# Patient Record
Sex: FEMALE
[De-identification: names, ages, dates, MRNs, and addresses within clinical notes are randomized per-mention and may not be internally consistent; named-entity substitution may affect disease eponyms.]

---

## 2022-04-26 ENCOUNTER — RX ONLY (OUTPATIENT)
Age: 87
Setting detail: RX ONLY
End: 2022-04-26

## 2022-04-26 ENCOUNTER — APPOINTMENT (RX ONLY)
Dept: URBAN - METROPOLITAN AREA CLINIC 308 | Facility: CLINIC | Age: 87
Setting detail: DERMATOLOGY
End: 2022-04-26

## 2022-04-26 DIAGNOSIS — I87.2 VENOUS INSUFFICIENCY (CHRONIC) (PERIPHERAL): ICD-10-CM

## 2022-04-26 PROCEDURE — ? ADDITIONAL NOTES

## 2022-04-26 PROCEDURE — ? PRESCRIPTION

## 2022-04-26 PROCEDURE — ? COUNSELING

## 2022-04-26 PROCEDURE — 99214 OFFICE O/P EST MOD 30 MIN: CPT

## 2022-04-26 PROCEDURE — ? PRESCRIPTION MEDICATION MANAGEMENT

## 2022-04-26 RX ORDER — TRIAMCINOLONE ACETONIDE 1 MG/G
OINTMENT TOPICAL
Qty: 80 | Refills: 5 | Status: ERX | COMMUNITY
Start: 2022-04-26

## 2022-04-26 RX ADMIN — TRIAMCINOLONE ACETONIDE: 1 OINTMENT TOPICAL at 00:00

## 2022-04-26 ASSESSMENT — LOCATION DETAILED DESCRIPTION DERM
LOCATION DETAILED: LEFT DISTAL PRETIBIAL REGION
LOCATION DETAILED: RIGHT DORSAL FOOT
LOCATION DETAILED: RIGHT DISTAL PRETIBIAL REGION
LOCATION DETAILED: LEFT DORSAL FOOT

## 2022-04-26 ASSESSMENT — LOCATION SIMPLE DESCRIPTION DERM
LOCATION SIMPLE: RIGHT FOOT
LOCATION SIMPLE: LEFT PRETIBIAL REGION
LOCATION SIMPLE: LEFT FOOT
LOCATION SIMPLE: RIGHT PRETIBIAL REGION

## 2022-04-26 ASSESSMENT — LOCATION ZONE DERM
LOCATION ZONE: LEG
LOCATION ZONE: FEET

## 2022-04-26 NOTE — PROCEDURE: ADDITIONAL NOTES
Additional Notes: Pt complains of significant pain in her legs where swelling is present. \\nDiscussed concern for blood clots.\\nWill discuss care plan with nurse at [facility] who will be assisting the pt twice weekly. Additional Notes: Pt complains of significant pain in her legs where swelling is present. \\nDiscussed concern for blood clots.\\nWill discuss care plan with nurse at [facility] who will be assisting the pt twice weekly.

## 2022-04-26 NOTE — HPI: RASH
What Type Of Note Output Would You Prefer (Optional)?: Bullet Format
Is The Patient Presenting As Previously Scheduled?: Yes
Is This A New Presentation, Or A Follow-Up?: Rash
Additional History: Pt is unable to wear compression stockings as her socks rub on the sores on her feet and cause pain.\\nPt has a history of a blood clot in her left leg.\\nPt is taking an opiate for pain. \\nPt states a nurse going to be sent to her home to help wrap her legs.

## 2022-04-26 NOTE — PROCEDURE: PRESCRIPTION MEDICATION MANAGEMENT
Plan: Recommend spraying feet and lower legs with Levacyn spray twice every day. After Levacyn dries, cover affected areas with triamcinalone ointment twice daily. \\nDiscussed that infection can happen quickly with this condition and it is important to treat.
Render In Strict Bullet Format?: No
Detail Level: Zone

## 2022-06-01 ENCOUNTER — APPOINTMENT (RX ONLY)
Dept: URBAN - METROPOLITAN AREA CLINIC 308 | Facility: CLINIC | Age: 87
Setting detail: DERMATOLOGY
End: 2022-06-01

## 2022-06-01 DIAGNOSIS — L30.2 CUTANEOUS AUTOSENSITIZATION: ICD-10-CM

## 2022-06-01 DIAGNOSIS — I87.2 VENOUS INSUFFICIENCY (CHRONIC) (PERIPHERAL): ICD-10-CM | Status: IMPROVED

## 2022-06-01 PROCEDURE — ? COUNSELING

## 2022-06-01 PROCEDURE — 99214 OFFICE O/P EST MOD 30 MIN: CPT

## 2022-06-01 PROCEDURE — ? PRESCRIPTION MEDICATION MANAGEMENT

## 2022-06-01 PROCEDURE — ? ADDITIONAL NOTES

## 2022-06-01 PROCEDURE — ? TREATMENT REGIMEN

## 2022-06-01 PROCEDURE — ? PRESCRIPTION

## 2022-06-01 RX ORDER — TRIAMCINOLONE ACETONIDE 1 MG/G
OINTMENT TOPICAL
Qty: 80 | Refills: 5 | Status: ERX

## 2022-06-01 RX ORDER — CLOBETASOL PROPIONATE 0.5 MG/G
OINTMENT TOPICAL
Qty: 60 | Refills: 5 | Status: ERX | COMMUNITY
Start: 2022-06-01

## 2022-06-01 RX ADMIN — CLOBETASOL PROPIONATE: 0.5 OINTMENT TOPICAL at 00:00

## 2022-06-01 ASSESSMENT — LOCATION SIMPLE DESCRIPTION DERM
LOCATION SIMPLE: UPPER BACK
LOCATION SIMPLE: RIGHT PRETIBIAL REGION
LOCATION SIMPLE: RIGHT ELBOW
LOCATION SIMPLE: LEFT ANKLE
LOCATION SIMPLE: LEFT ELBOW

## 2022-06-01 ASSESSMENT — LOCATION ZONE DERM
LOCATION ZONE: LEG
LOCATION ZONE: TRUNK
LOCATION ZONE: ARM

## 2022-06-01 ASSESSMENT — LOCATION DETAILED DESCRIPTION DERM
LOCATION DETAILED: LEFT ANKLE
LOCATION DETAILED: RIGHT DISTAL PRETIBIAL REGION
LOCATION DETAILED: LEFT ELBOW
LOCATION DETAILED: SUPERIOR THORACIC SPINE
LOCATION DETAILED: RIGHT ELBOW

## 2022-06-01 NOTE — PROCEDURE: TREATMENT REGIMEN
Plan: On the back recommended pt apply triamcinolone topically twice daily until clear. \\nOn the elbows and hands recommended topical application of clobetasol twice daily until resolved.
Detail Level: Zone
Plan: Apply triamcinolone topically twice daily to affected areas on legs until clear.\\nApply clobetasol topically twice daily to stubborn thickened areas in legs and pt’s heels until clear. \\nPt stated she cannot use compression stockings at this time as it is too painful. Once heels begin to clear up, discussed importance of wearing compression stockings. \\nRecommended follow up in 4 to 6 weeks.

## 2022-06-01 NOTE — PROCEDURE: PRESCRIPTION MEDICATION MANAGEMENT
Detail Level: Zone
Plan: Apply triamcinolone topically twice daily to affected areas on the legs. \\nFor stubborn areas on the legs apply  topically twice daily.
Render In Strict Bullet Format?: No

## 2024-02-15 ENCOUNTER — APPOINTMENT (RX ONLY)
Dept: URBAN - METROPOLITAN AREA CLINIC 312 | Facility: CLINIC | Age: 89
Setting detail: DERMATOLOGY
End: 2024-02-15

## 2024-02-15 DIAGNOSIS — T07XXXA ABRASION OR FRICTION BURN OF OTHER, MULTIPLE, AND UNSPECIFIED SITES, WITHOUT MENTION OF INFECTION: ICD-10-CM | Status: INADEQUATELY CONTROLLED

## 2024-02-15 PROBLEM — T14.8XXA OTHER INJURY OF UNSPECIFIED BODY REGION, INITIAL ENCOUNTER: Status: ACTIVE | Noted: 2024-02-15

## 2024-02-15 PROCEDURE — ? COUNSELING

## 2024-02-15 PROCEDURE — 99203 OFFICE O/P NEW LOW 30 MIN: CPT
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
## 2024-02-15 NOTE — PROCEDURE: COUNSELING
Patient Specific Counseling (Will Not Stick From Patient To Patient): No evidence of secondary infection thus no cx preformed.  Pt will f/u with the wound care clinic for additional ongoing intervention.  Appears to be secondary to venous insufficiency and stasis.  Condition appears to have been triggered by injury.  Wound was cleaned and rebandaged at today's visit.
Detail Level: Detailed